# Patient Record
Sex: MALE | Race: WHITE | NOT HISPANIC OR LATINO | Employment: UNEMPLOYED | URBAN - METROPOLITAN AREA
[De-identification: names, ages, dates, MRNs, and addresses within clinical notes are randomized per-mention and may not be internally consistent; named-entity substitution may affect disease eponyms.]

---
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
## 2022-03-02 ENCOUNTER — OFFICE VISIT (OUTPATIENT)
Dept: URGENT CARE | Facility: URGENT CARE | Age: 22
End: 2022-03-02
Payer: COMMERCIAL

## 2022-03-02 VITALS
DIASTOLIC BLOOD PRESSURE: 80 MMHG | SYSTOLIC BLOOD PRESSURE: 127 MMHG | TEMPERATURE: 99.5 F | WEIGHT: 180 LBS | OXYGEN SATURATION: 96 % | BODY MASS INDEX: 23.1 KG/M2 | HEIGHT: 74 IN | HEART RATE: 115 BPM

## 2022-03-02 DIAGNOSIS — J02.0 STREPTOCOCCAL PHARYNGITIS: Primary | ICD-10-CM

## 2022-03-02 DIAGNOSIS — B00.1 COLD SORE: ICD-10-CM

## 2022-03-02 LAB — DEPRECATED S PYO AG THROAT QL EIA: POSITIVE

## 2022-03-02 PROCEDURE — 99203 OFFICE O/P NEW LOW 30 MIN: CPT | Performed by: PHYSICIAN ASSISTANT

## 2022-03-02 PROCEDURE — 87880 STREP A ASSAY W/OPTIC: CPT | Performed by: PHYSICIAN ASSISTANT

## 2022-03-02 RX ORDER — AMOXICILLIN 500 MG/1
500 CAPSULE ORAL 2 TIMES DAILY
Qty: 20 CAPSULE | Refills: 0 | Status: SHIPPED | OUTPATIENT
Start: 2022-03-02 | End: 2022-03-02 | Stop reason: ALTCHOICE

## 2022-03-02 RX ORDER — CEFDINIR 300 MG/1
300 CAPSULE ORAL 2 TIMES DAILY
Qty: 20 CAPSULE | Refills: 0 | Status: SHIPPED | OUTPATIENT
Start: 2022-03-02 | End: 2022-03-12

## 2022-03-02 RX ORDER — VALACYCLOVIR HYDROCHLORIDE 1 G/1
2000 TABLET, FILM COATED ORAL 2 TIMES DAILY
Qty: 4 TABLET | Refills: 0 | Status: SHIPPED | OUTPATIENT
Start: 2022-03-02 | End: 2022-04-07

## 2022-03-02 NOTE — PROGRESS NOTES
"URGENT CARE VISIT:    SUBJECTIVE:   Chicho Castellon is a 21 year old male presenting with a chief complaint of sore throat.  Onset was 2 day(s) ago. He denies the following symptoms: fever, chills, stuffy nose and cough - non-productive. Course of illness is same. Treatment measures tried include Tylenol/Ibuprofen with no relief of symptoms. Predisposing factors include None.    Also, requests cold sore medication refill    PMH: History reviewed. No pertinent past medical history.  Allergies: Patient has no allergy information on record.   Medications:   Current Outpatient Medications   Medication Sig Dispense Refill     cefdinir (OMNICEF) 300 MG capsule Take 1 capsule (300 mg) by mouth 2 times daily for 10 days 20 capsule 0     valACYclovir (VALTREX) 1000 mg tablet Take 2 tablets (2,000 mg) by mouth 2 times daily for 1 day 4 tablet 0     acyclovir (ZOVIRAX) 800 MG tablet Take 1 tablet (800 mg) by mouth 2 times daily for 7 days For cold sore 14 tablet 5     Social History:   Social History     Tobacco Use     Smoking status: Never Smoker     Smokeless tobacco: Never Used   Substance Use Topics     Alcohol use: No       ROS:  Review of systems negative except as stated above.    OBJECTIVE:  /80 (BP Location: Left arm, Patient Position: Sitting, Cuff Size: Adult Regular)   Pulse 115   Temp 99.5  F (37.5  C) (Tympanic)   Ht 1.88 m (6' 2\")   Wt 81.6 kg (180 lb)   SpO2 96%   BMI 23.11 kg/m    GENERAL APPEARANCE: healthy, alert and no distress  EYES: EOMI,  PERRL, conjunctiva clear  HENT: ear canals and TM's normal.  Moderately edematous oropharynx. Uvula midline  NECK: supple, nontender, no lymphadenopathy  RESP: lungs clear to auscultation - no rales, rhonchi or wheezes  CV: regular rates and rhythm, normal S1 S2, no murmur noted  SKIN: no suspicious lesions or rashes    Labs:    Results for orders placed or performed in visit on 03/02/22   Streptococcus A Rapid Screen w/Reflex to PCR     Status: Abnormal    " Specimen: Throat; Swab   Result Value Ref Range    Group A Strep antigen Positive (A) Negative       ASSESSMENT:    ICD-10-CM    1. Streptococcal pharyngitis  J02.0 Streptococcus A Rapid Screen w/Reflex to PCR     cefdinir (OMNICEF) 300 MG capsule     DISCONTINUED: amoxicillin (AMOXIL) 500 MG capsule   2. Cold sore  B00.1 valACYclovir (VALTREX) 1000 mg tablet       PLAN:  Patient Instructions   Strep:  Patient was educated on the natural course of bacterial throat infection. Possibly allergic to Amoxicillin. Will rx Cefdinir. Take medications as prescribed. Side effects discussed. Conservative measures discussed including warm fluids, salt water gargles, Lozenges (Cepacol), and over-the-counter analgesics (Tylenol or Ibuprofen). To prevent spread avoid sharing utensils or glasses until he has completed 24 hours of antibiotic treatment.  Change toothbrush after 24 hrs of being on antibiotic. See your primary care provider if symptoms worsen or do not improve in 5 days. Seek emergency care if you develop severe throat pain, or difficulty swallowing.     Cold sores:  Has recurrent cold sore but no active infection. Valtrex refilled.  Patient verbalized understanding and is agreeable to plan. The patient was discharged ambulatory and in stable condition.    Juany Riggs PA-C ....................  3/2/2022   3:33 PM

## 2022-04-05 NOTE — PROGRESS NOTES
SUBJECTIVE:   CC: Chicho Castellon is an 21 year old male who presents for preventative health visit.     Patient has been advised of split billing requirements and indicates understanding: Yes  Healthy Habits:     Getting at least 3 servings of Calcium per day:  Yes    Bi-annual eye exam:  NO    Dental care twice a year:  Yes    Sleep apnea or symptoms of sleep apnea:  None    Diet:  Regular (no restrictions) and Breakfast skipped    Frequency of exercise:  2-3 days/week    Duration of exercise:  Other    Taking medications regularly:  Yes    PHQ-2 Total Score: 0    Additional concerns today:  No     He reports being in good health.  He has a history of cold sores and takes valacyclovir when needed.  This can occur every few months.    Social history: Single, no children.  He recently graduated from [employer] [place] and will be starting a job doing [employer] starting in June.          Today's PHQ-2 Score:   PHQ-2 ( 1999 Pfizer) 12/31/2018   Q1: Little interest or pleasure in doing things 0   Q2: Feeling down, depressed or hopeless 0   PHQ-2 Score 0   PHQ-2 Total Score (12-17 Years)- Positive if 3 or more points; Administer PHQ-A if positive 0       Abuse: Current or Past(Physical, Sexual or Emotional)- No  Do you feel safe in your environment? Yes    Have you ever done Advance Care Planning? (For example, a Health Directive, POLST, or a discussion with a medical provider or your loved ones about your wishes): No, advance care planning information given to patient to review.  Patient declined advance care planning discussion at this time.    Social History     Tobacco Use     Smoking status: Never Smoker     Smokeless tobacco: Never Used   Substance Use Topics     Alcohol use: No     If you drink alcohol do you typically have >3 drinks per day or >7 drinks per week? No    No flowsheet data found.  AUDIT - Alcohol Use Disorders Identification Test - Reproduced from the World Health Organization Audit 2001  (Second Edition) 4/7/2022   1.  How often do you have a drink containing alcohol? 2 to 3 times a week   2.  How many drinks containing alcohol do you have on a typical day when you are drinking? 5 or 6   3.  How often do you have five or more drinks on one occasion? Weekly   4.  How often during the last year have you found that you were not able to stop drinking once you had started? Never   5.  How often during the last year have you failed to do what was normally expected of you because of drinking? Never   6.  How often during the last year have you needed a first drink in the morning to get yourself going after a heavy drinking session? Never   7.  How often during the last year have you had a feeling of guilt or remorse after drinking? Less than monthly   8.  How often during the last year have you been unable to remember what happened the night before because of your drinking? Less than monthly   9.  Have you or someone else been injured because of your drinking? No   10. Has a relative, friend, doctor or other health care worker been concerned about your drinking or suggested you cut down? No   TOTAL SCORE 10       Last PSA: No results found for: PSA    Reviewed orders with patient. Reviewed health maintenance and updated orders accordingly - Yes  Labs reviewed in EPIC    Reviewed and updated as needed this visit by clinical staff                  Reviewed and updated as needed this visit by Provider                     Review of Systems      OBJECTIVE:   There were no vitals taken for this visit.    Physical Exam  GENERAL: healthy, alert and no distress  EYES: Eyes grossly normal to inspection, PERRL and conjunctivae and sclerae normal  HENT: ear canals and TM's normal, nose and mouth without ulcers or lesions  NECK: no adenopathy, no asymmetry, masses, or scars and thyroid normal to palpation  RESP: lungs clear to auscultation - no rales, rhonchi or wheezes  CV: regular rate and rhythm, normal S1 S2, no S3  "or S4, no murmur, click or rub, no peripheral edema and peripheral pulses strong  ABDOMEN: soft, nontender, no hepatosplenomegaly, no masses and bowel sounds normal  MS: no gross musculoskeletal defects noted, no edema  SKIN: no suspicious lesions or rashes  NEURO: Normal strength and tone, mentation intact and speech normal  PSYCH: mentation appears normal, affect normal/bright    Diagnostic Test Results:  Labs reviewed in Epic    ASSESSMENT/PLAN:   1. Routine general medical examination at a health care facility  I encouraged him to get regular exercise and eat a healthy diet.  We are going to check labs as listed below.    2. HSV-1 (herpes simplex virus 1) infection  He was given a refill of valacyclovir that he uses as needed for cold sore outbreaks.  - valACYclovir (VALTREX) 1000 mg tablet; Take 2 tablets (2,000 mg) by mouth 2 times daily  Dispense: 20 tablet; Refill: 2    3. Screening for HIV (human immunodeficiency virus)  - HIV Antigen Antibody Combo; Future  - HIV Antigen Antibody Combo    4. Need for hepatitis C screening test  - Hepatitis C Screen Reflex to HCV RNA Quant and Genotype; Future  - Hepatitis C Screen Reflex to HCV RNA Quant and Genotype    5. Screening cholesterol level  - Lipid Profile (Chol, Trig, HDL, LDL calc); Future  - Lipid Profile (Chol, Trig, HDL, LDL calc)      Patient has been advised of split billing requirements and indicates understanding: Yes    COUNSELING:   Reviewed preventive health counseling, as reflected in patient instructions       Regular exercise       Healthy diet/nutrition    Estimated body mass index is 23.11 kg/m  as calculated from the following:    Height as of 3/2/22: 1.88 m (6' 2\").    Weight as of 3/2/22: 81.6 kg (180 lb).         He reports that he has never smoked. He has never used smokeless tobacco.      Counseling Resources:  ATP IV Guidelines  Pooled Cohorts Equation Calculator  FRAX Risk Assessment  ICSI Preventive Guidelines  Dietary Guidelines for " Americans, 2010  USDA's MyPlate  ASA Prophylaxis  Lung CA Screening    Antony Castrejon, [facility]

## 2022-04-07 ENCOUNTER — OFFICE VISIT (OUTPATIENT)
Dept: FAMILY MEDICINE | Facility: CLINIC | Age: 22
End: 2022-04-07
Payer: COMMERCIAL

## 2022-04-07 VITALS
HEART RATE: 97 BPM | HEIGHT: 74 IN | RESPIRATION RATE: 16 BRPM | SYSTOLIC BLOOD PRESSURE: 130 MMHG | DIASTOLIC BLOOD PRESSURE: 78 MMHG | TEMPERATURE: 98 F | BODY MASS INDEX: 24.13 KG/M2 | OXYGEN SATURATION: 97 % | WEIGHT: 188 LBS

## 2022-04-07 DIAGNOSIS — Z11.59 NEED FOR HEPATITIS C SCREENING TEST: ICD-10-CM

## 2022-04-07 DIAGNOSIS — Z13.220 SCREENING CHOLESTEROL LEVEL: ICD-10-CM

## 2022-04-07 DIAGNOSIS — Z11.4 SCREENING FOR HIV (HUMAN IMMUNODEFICIENCY VIRUS): ICD-10-CM

## 2022-04-07 DIAGNOSIS — B00.9 HSV-1 (HERPES SIMPLEX VIRUS 1) INFECTION: ICD-10-CM

## 2022-04-07 DIAGNOSIS — Z00.00 ROUTINE GENERAL MEDICAL EXAMINATION AT A HEALTH CARE FACILITY: Primary | ICD-10-CM

## 2022-04-07 PROCEDURE — 80061 LIPID PANEL: CPT | Performed by: FAMILY MEDICINE

## 2022-04-07 PROCEDURE — 87389 HIV-1 AG W/HIV-1&-2 AB AG IA: CPT | Performed by: FAMILY MEDICINE

## 2022-04-07 PROCEDURE — 90471 IMMUNIZATION ADMIN: CPT | Performed by: FAMILY MEDICINE

## 2022-04-07 PROCEDURE — 90715 TDAP VACCINE 7 YRS/> IM: CPT | Performed by: FAMILY MEDICINE

## 2022-04-07 PROCEDURE — 36415 COLL VENOUS BLD VENIPUNCTURE: CPT | Performed by: FAMILY MEDICINE

## 2022-04-07 PROCEDURE — 86803 HEPATITIS C AB TEST: CPT | Performed by: FAMILY MEDICINE

## 2022-04-07 PROCEDURE — 99395 PREV VISIT EST AGE 18-39: CPT | Mod: 25 | Performed by: FAMILY MEDICINE

## 2022-04-07 RX ORDER — VALACYCLOVIR HYDROCHLORIDE 1 G/1
2000 TABLET, FILM COATED ORAL 2 TIMES DAILY
Qty: 20 TABLET | Refills: 2 | Status: SHIPPED | OUTPATIENT
Start: 2022-04-07 | End: 2022-09-26

## 2022-04-07 ASSESSMENT — ENCOUNTER SYMPTOMS
JOINT SWELLING: 0
PARESTHESIAS: 0
CONSTIPATION: 0
MYALGIAS: 0
SHORTNESS OF BREATH: 0
NERVOUS/ANXIOUS: 0
NAUSEA: 0
PALPITATIONS: 0
DIARRHEA: 0
ARTHRALGIAS: 0
COUGH: 0
FEVER: 0
WEAKNESS: 0
HEARTBURN: 0
HEMATOCHEZIA: 0
HEADACHES: 0
SORE THROAT: 0
ABDOMINAL PAIN: 0
CHILLS: 0
FREQUENCY: 0
DYSURIA: 0
HEMATURIA: 0
EYE PAIN: 0
DIZZINESS: 0

## 2022-04-07 NOTE — NURSING NOTE
"Chief Complaint   Patient presents with     Physical     refill cold sore meds     initial BP (!) 148/83   Pulse 97   Temp 98  F (36.7  C) (Tympanic)   Resp 16   Ht 1.867 m (6' 1.5\")   Wt 85.3 kg (188 lb)   SpO2 97%   BMI 24.47 kg/m   Estimated body mass index is 24.47 kg/m  as calculated from the following:    Height as of this encounter: 1.867 m (6' 1.5\").    Weight as of this encounter: 85.3 kg (188 lb).  BP completed using cuff size: regular.  L  arm      Health Maintenance that is potentially due pending provider review:  NONE    n/a    Daniel Perkins ma  "

## 2022-04-08 LAB
CHOLEST SERPL-MCNC: 259 MG/DL
FASTING STATUS PATIENT QL REPORTED: NO
HCV AB SERPL QL IA: NONREACTIVE
HDLC SERPL-MCNC: 42 MG/DL
HIV 1+2 AB+HIV1 P24 AG SERPL QL IA: NONREACTIVE
LDLC SERPL CALC-MCNC: 177 MG/DL
NONHDLC SERPL-MCNC: 217 MG/DL
TRIGL SERPL-MCNC: 202 MG/DL

## 2022-06-14 NOTE — PATIENT INSTRUCTIONS
Strep:  Patient was educated on the natural course of bacterial throat infection. Take medications as prescribed. Side effects discussed. Conservative measures discussed including warm fluids, salt water gargles, Lozenges (Cepacol), and over-the-counter analgesics (Tylenol or Ibuprofen). To prevent spread avoid sharing utensils or glasses until he has completed 24 hours of antibiotic treatment.  Change toothbrush after 24 hrs of being on antibiotic. See your primary care provider if symptoms worsen or do not improve in 5 days. Seek emergency care if you develop severe throat pain, or difficulty swallowing.     Cold sores:  Has recurrent cold sore but no active infection. Valtrex refilled.   Intermediate Repair Preamble Text (Leave Blank If You Do Not Want): Wide undermining was performed with blunt dissection.

## 2022-09-18 ENCOUNTER — HEALTH MAINTENANCE LETTER (OUTPATIENT)
Age: 22
End: 2022-09-18

## 2022-09-23 ENCOUNTER — TELEPHONE (OUTPATIENT)
Dept: FAMILY MEDICINE | Facility: CLINIC | Age: 22
End: 2022-09-23

## 2022-09-23 DIAGNOSIS — B00.9 HSV-1 (HERPES SIMPLEX VIRUS 1) INFECTION: ICD-10-CM

## 2022-09-23 NOTE — TELEPHONE ENCOUNTER
Medication Question or Refill    Contacts       Type Contact Phone/Fax    09/23/2022 05:42 PM CDT Phone (Incoming) Chicho Castellon (Self) [phone] (H)          What medication are you calling about (include dose and sig)?: Zalaeyzlozir     Controlled Substance Agreement on file:   CSA -- Patient Level:    CSA: None found at the patient level.       Who prescribed the medication?: Dr. Cash Roque     Do you need a refill? Yes: yes    When did you use the medication last? 2 months ago    Patient offered an appointment? Yes: Patient was seen for this medicine already    Do you have any questions or concerns?  Yes: Would like RX transferred to a different pharmacy current one is out of network with his insurance. Would like it transferred to [facility]  [phone] at [address] [place] [address]     Preferred Pharmacy:   [id number] [place] [address] AT [facility] & [address] [place] [facility] [address]  Phone: [phone] Fax: [phone]    [facility]/pharmacy #[id number] [address]  [address] [facility] [address]  Phone: [phone] Fax: [phone]      Could we send this information to you [facility] or would you prefer to receive a phone call?:   Patient would prefer a phone call   Okay to leave a detailed message?: Yes at Cell number on file:    Telephone Information:   Mobile [phone]

## 2022-09-26 RX ORDER — VALACYCLOVIR HYDROCHLORIDE 1 G/1
2000 TABLET, FILM COATED ORAL 2 TIMES DAILY
Qty: 20 TABLET | Refills: 2 | Status: SHIPPED | OUTPATIENT
Start: 2022-09-26 | End: 2022-09-26

## 2022-09-26 RX ORDER — VALACYCLOVIR HYDROCHLORIDE 1 G/1
2000 TABLET, FILM COATED ORAL 2 TIMES DAILY
Qty: 20 TABLET | Refills: 2 | Status: SHIPPED | OUTPATIENT
Start: 2022-09-26 | End: 2024-02-02

## 2022-09-26 NOTE — TELEPHONE ENCOUNTER
Prescription approved per [facility] Refill Protocol.  [place]ton [place] RN

## 2023-05-07 ENCOUNTER — HEALTH MAINTENANCE LETTER (OUTPATIENT)
Age: 23
End: 2023-05-07

## 2024-02-02 ENCOUNTER — MYC REFILL (OUTPATIENT)
Dept: FAMILY MEDICINE | Facility: CLINIC | Age: 24
End: 2024-02-02
Payer: COMMERCIAL

## 2024-02-02 DIAGNOSIS — B00.9 HSV-1 (HERPES SIMPLEX VIRUS 1) INFECTION: ICD-10-CM

## 2024-02-05 RX ORDER — VALACYCLOVIR HYDROCHLORIDE 1 G/1
2000 TABLET, FILM COATED ORAL 2 TIMES DAILY
Qty: 20 TABLET | Refills: 2 | Status: SHIPPED | OUTPATIENT
Start: 2024-02-05 | End: 2024-02-27

## 2024-02-27 ENCOUNTER — OFFICE VISIT (OUTPATIENT)
Dept: FAMILY MEDICINE | Facility: CLINIC | Age: 24
End: 2024-02-27
Payer: COMMERCIAL

## 2024-02-27 VITALS
OXYGEN SATURATION: 99 % | BODY MASS INDEX: 25.71 KG/M2 | TEMPERATURE: 97.5 F | WEIGHT: 194 LBS | DIASTOLIC BLOOD PRESSURE: 89 MMHG | HEART RATE: 88 BPM | RESPIRATION RATE: 16 BRPM | HEIGHT: 73 IN | SYSTOLIC BLOOD PRESSURE: 139 MMHG

## 2024-02-27 DIAGNOSIS — R68.82 LOW LIBIDO: ICD-10-CM

## 2024-02-27 DIAGNOSIS — Z11.3 SCREENING FOR STDS (SEXUALLY TRANSMITTED DISEASES): ICD-10-CM

## 2024-02-27 DIAGNOSIS — Z13.1 SCREENING FOR DIABETES MELLITUS: ICD-10-CM

## 2024-02-27 DIAGNOSIS — Z13.220 SCREENING CHOLESTEROL LEVEL: ICD-10-CM

## 2024-02-27 DIAGNOSIS — B00.9 HSV-1 (HERPES SIMPLEX VIRUS 1) INFECTION: ICD-10-CM

## 2024-02-27 DIAGNOSIS — Z00.00 ROUTINE GENERAL MEDICAL EXAMINATION AT A HEALTH CARE FACILITY: Primary | ICD-10-CM

## 2024-02-27 LAB
ALBUMIN SERPL BCG-MCNC: 4.8 G/DL (ref 3.5–5.2)
ALP SERPL-CCNC: 93 U/L (ref 40–150)
ALT SERPL W P-5'-P-CCNC: 37 U/L (ref 0–70)
ANION GAP SERPL CALCULATED.3IONS-SCNC: 11 MMOL/L (ref 7–15)
AST SERPL W P-5'-P-CCNC: 24 U/L (ref 0–45)
BILIRUB SERPL-MCNC: 0.5 MG/DL
BUN SERPL-MCNC: 18 MG/DL (ref 6–20)
C TRACH DNA SPEC QL PROBE+SIG AMP: NEGATIVE
CALCIUM SERPL-MCNC: 9.4 MG/DL (ref 8.6–10)
CHLORIDE SERPL-SCNC: 104 MMOL/L (ref 98–107)
CHOLEST SERPL-MCNC: 229 MG/DL
CREAT SERPL-MCNC: 1.22 MG/DL (ref 0.67–1.17)
DEPRECATED HCO3 PLAS-SCNC: 25 MMOL/L (ref 22–29)
EGFRCR SERPLBLD CKD-EPI 2021: 85 ML/MIN/1.73M2
ERYTHROCYTE [DISTWIDTH] IN BLOOD BY AUTOMATED COUNT: 12.1 % (ref 10–15)
FASTING STATUS PATIENT QL REPORTED: ABNORMAL
GLUCOSE SERPL-MCNC: 96 MG/DL (ref 70–99)
HCT VFR BLD AUTO: 46.3 % (ref 40–53)
HDLC SERPL-MCNC: 49 MG/DL
HGB BLD-MCNC: 16 G/DL (ref 13.3–17.7)
HIV 1+2 AB+HIV1 P24 AG SERPL QL IA: NONREACTIVE
LDLC SERPL CALC-MCNC: 159 MG/DL
MCH RBC QN AUTO: 30.7 PG (ref 26.5–33)
MCHC RBC AUTO-ENTMCNC: 34.6 G/DL (ref 31.5–36.5)
MCV RBC AUTO: 89 FL (ref 78–100)
N GONORRHOEA DNA SPEC QL NAA+PROBE: NEGATIVE
NONHDLC SERPL-MCNC: 180 MG/DL
PLATELET # BLD AUTO: 230 10E3/UL (ref 150–450)
POTASSIUM SERPL-SCNC: 4.3 MMOL/L (ref 3.4–5.3)
PROT SERPL-MCNC: 6.7 G/DL (ref 6.4–8.3)
RBC # BLD AUTO: 5.21 10E6/UL (ref 4.4–5.9)
SHBG SERPL-SCNC: 21 NMOL/L (ref 11–80)
SODIUM SERPL-SCNC: 140 MMOL/L (ref 135–145)
T PALLIDUM AB SER QL: NONREACTIVE
TRIGL SERPL-MCNC: 106 MG/DL
WBC # BLD AUTO: 5.6 10E3/UL (ref 4–11)

## 2024-02-27 PROCEDURE — 87591 N.GONORRHOEAE DNA AMP PROB: CPT | Performed by: FAMILY MEDICINE

## 2024-02-27 PROCEDURE — 87389 HIV-1 AG W/HIV-1&-2 AB AG IA: CPT | Performed by: FAMILY MEDICINE

## 2024-02-27 PROCEDURE — 86780 TREPONEMA PALLIDUM: CPT | Performed by: FAMILY MEDICINE

## 2024-02-27 PROCEDURE — 99395 PREV VISIT EST AGE 18-39: CPT | Performed by: FAMILY MEDICINE

## 2024-02-27 PROCEDURE — 36415 COLL VENOUS BLD VENIPUNCTURE: CPT | Performed by: FAMILY MEDICINE

## 2024-02-27 PROCEDURE — 84270 ASSAY OF SEX HORMONE GLOBUL: CPT | Performed by: FAMILY MEDICINE

## 2024-02-27 PROCEDURE — 80061 LIPID PANEL: CPT | Performed by: FAMILY MEDICINE

## 2024-02-27 PROCEDURE — 84403 ASSAY OF TOTAL TESTOSTERONE: CPT | Performed by: FAMILY MEDICINE

## 2024-02-27 PROCEDURE — 80053 COMPREHEN METABOLIC PANEL: CPT | Performed by: FAMILY MEDICINE

## 2024-02-27 PROCEDURE — 87491 CHLMYD TRACH DNA AMP PROBE: CPT | Performed by: FAMILY MEDICINE

## 2024-02-27 PROCEDURE — 85027 COMPLETE CBC AUTOMATED: CPT | Performed by: FAMILY MEDICINE

## 2024-02-27 RX ORDER — VALACYCLOVIR HYDROCHLORIDE 1 G/1
2000 TABLET, FILM COATED ORAL 2 TIMES DAILY
Qty: 20 TABLET | Refills: 2 | Status: SHIPPED | OUTPATIENT
Start: 2024-02-27

## 2024-02-27 SDOH — HEALTH STABILITY: PHYSICAL HEALTH: ON AVERAGE, HOW MANY DAYS PER WEEK DO YOU ENGAGE IN MODERATE TO STRENUOUS EXERCISE (LIKE A BRISK WALK)?: 6 DAYS

## 2024-02-27 SDOH — HEALTH STABILITY: PHYSICAL HEALTH: ON AVERAGE, HOW MANY MINUTES DO YOU ENGAGE IN EXERCISE AT THIS LEVEL?: 60 MIN

## 2024-02-27 ASSESSMENT — SOCIAL DETERMINANTS OF HEALTH (SDOH): HOW OFTEN DO YOU GET TOGETHER WITH FRIENDS OR RELATIVES?: MORE THAN THREE TIMES A WEEK

## 2024-02-27 ASSESSMENT — PAIN SCALES - GENERAL: PAINLEVEL: NO PAIN (0)

## 2024-02-27 NOTE — PROGRESS NOTES
"Preventive Care Visit  [facility]  Antony Castrejon DO, Family Medicine  Feb 27, 2024    Assessment & Plan     Routine general medical examination at a health care facility  He appears to be in good health.  I encouraged him to keep exercising and eating a healthy diet.  We are going to check fasting labs as listed below.    HSV-1 (herpes simplex virus 1) infection  He was given a refill of valacyclovir that he may use as needed for cold sore outbreaks.  - valACYclovir (VALTREX) 1000 mg tablet; Take 2 tablets (2,000 mg) by mouth 2 times daily    Screening cholesterol level  His cholesterol was quite high a couple years ago.  He is working on lifestyle modifications and we are going to recheck the levels today.  - Lipid Profile (Chol, Trig, HDL, LDL calc); Future  - Lipid Profile (Chol, Trig, HDL, LDL calc)    Screening for diabetes mellitus  - Comprehensive metabolic panel (BMP + Alb, Alk Phos, ALT, AST, Total. Bili, TP); Future  - Comprehensive metabolic panel (BMP + Alb, Alk Phos, ALT, AST, Total. Bili, TP)    Low libido  He is interested in having testosterone levels checked as he has struggled some with low libido and difficulty gaining muscle mass while working out in the gym.  - Testosterone Free and Total; Future  - CBC with platelets; Future  - Testosterone Free and Total  - CBC with platelets    Screening for STDs (sexually transmitted diseases)  - HIV Antigen Antibody Combo; Future  - Treponema Abs w Reflex to RPR and Titer; Future  - Chlamydia & Gonorrhea by PCR, [facility] - Clinic Collect; Future  - HIV Antigen Antibody Combo  - Treponema Abs w Reflex to RPR and Titer  - Chlamydia & Gonorrhea by PCR, [facility] - Clinic Collect    Patient has been advised of split billing requirements and indicates understanding: Yes          BMI  Estimated body mass index is 25.3 kg/m  as calculated from the following:    Height as of this encounter: 1.865 m (6' 1.43\").    Weight as of this " encounter: 88 kg (194 lb).   Weight management plan: Discussed healthy diet and exercise guidelines    Counseling  Appropriate preventive services were discussed with this patient, including applicable screening as appropriate for fall prevention, nutrition, physical activity, Tobacco-use cessation, weight loss and cognition.  Checklist reviewing preventive services available has been given to the patient.  Reviewed patient's diet, addressing concerns and/or questions.   He is at risk for psychosocial distress and has been provided with information to reduce risk.   The patient reports drinking more than one alcoholic drink per day and sometimes engages in binge or excessive drinking. The patient was counseled and given information about possible harmful effects of excessive alcohol intake as well as where to get help for alcohol problems.         Luis Valentino is a 23 year old, presenting for the following:  Physical        2/27/2024     6:50 AM   Additional Questions   Roomed by Milagro CURRY   Accompanied by n/a          HPI    He reports being in good health.  He has a history of cold sores and takes valacyclovir when needed.  This can occur every few months.  He is interested in having testosterone levels checked because he is having difficulty gaining muscle mass while working out in the gym and he has noticed some mild low libido symptoms.     Social history: Single, no children.  He graduated from [email or web address] [place] and works in investment banking             2/27/2024   General Health   How would you rate your overall physical health? Excellent   Feel stress (tense, anxious, or unable to sleep) Only a little   (!) STRESS CONCERN      2/27/2024   Nutrition   Three or more servings of calcium each day? Yes   Diet: Regular (no restrictions)   How many servings of fruit and vegetables per day? (!) 2-3   How many sweetened beverages each day? 0-1         2/27/2024   Exercise   Days per week of  moderate/strenous exercise 6 days   Average minutes spent exercising at this level 60 min         2/27/2024   Social Factors   Frequency of gathering with friends or relatives More than three times a week   Worry food won't last until get money to buy more No   Food not last or not have enough money for food? No   Do you have housing?  Yes   Are you worried about losing your housing? No   Lack of transportation? No   Unable to get utilities (heat,electricity)? Yes   Want help with housing or utility concern? No   (!) FINANCIAL RESOURCE STRAIN CONCERN      2/27/2024   Dental   Dentist two times every year? Yes         2/27/2024   TB Screening   Were you born outside of [place]?  No         Today's PHQ-2 Score:       2/27/2024     7:00 AM   PHQ-2 ( 1999 [employer])   Q1: Little interest or pleasure in doing things 0   Q2: Feeling down, depressed or hopeless 0   PHQ-2 Score 0   Q1: Little interest or pleasure in doing things Not at all   Q2: Feeling down, depressed or hopeless Not at all   PHQ-2 Score 0           2/27/2024   Substance Use   Alcohol more than 3/day or more than 7/wk Yes   How often do you have a drink containing alcohol 2 to 3 times a week   How many alcohol drinks on typical day 5 or 6   How often do you have 5+ drinks at one occasion Weekly   Audit 2/3 Score 5   How often not able to stop drinking once started Less than monthly   How often failed to do what normally expected Less than monthly   How often needed first drink in am after a heavy drinking session Never   How often feeling of guilt or remorse after drinking Less than monthly   How often unable to remember what happened the night before Less than monthly   Have you or someone else been injured because of your drinking No   Has anyone been concerned or suggested you cut down on drinking No   TOTAL SCORE - AUDIT 12   Do you use any other substances recreationally? (!) ALCOHOL    (!) COCAINE     Social History     Tobacco Use    Smoking status: Some  "Days    Smokeless tobacco: Never   Substance Use Topics    Alcohol use: Yes    Drug use: Yes     Types: Marijuana           2/27/2024   STI Screening   New sexual partner(s) since last STI/HIV test? (!) YES         2/27/2024   Contraception/Family Planning   Questions about contraception or family planning No        Reviewed and updated as needed this visit by Provider                          Review of Systems  Constitutional, HEENT, cardiovascular, pulmonary, GI, , musculoskeletal, neuro, skin, endocrine and psych systems are negative, except as otherwise noted.     Objective    Exam  /89 (BP Location: Left arm, Patient Position: Sitting, Cuff Size: Adult Large)   Pulse 88   Temp 97.5  F (36.4  C) (Temporal)   Resp 16   Ht 1.865 m (6' 1.43\")   Wt 88 kg (194 lb)   SpO2 99%   BMI 25.30 kg/m     Estimated body mass index is 25.3 kg/m  as calculated from the following:    Height as of this encounter: 1.865 m (6' 1.43\").    Weight as of this encounter: 88 kg (194 lb).    Physical Exam  GENERAL: alert and no distress  EYES: Eyes grossly normal to inspection, PERRL and conjunctivae and sclerae normal  HENT: ear canals and TM's normal, nose and mouth without ulcers or lesions  NECK: no adenopathy, no asymmetry, masses, or scars  RESP: lungs clear to auscultation - no rales, rhonchi or wheezes  CV: regular rate and rhythm, normal S1 S2, no S3 or S4, no murmur, click or rub, no peripheral edema  ABDOMEN: soft, nontender, no hepatosplenomegaly, no masses and bowel sounds normal  MS: no gross musculoskeletal defects noted, no edema  SKIN: no suspicious lesions or rashes  NEURO: Normal strength and tone, mentation intact and speech normal  PSYCH: mentation appears normal, affect normal/bright      Signed Electronically by: Antony Castrejon, DO    "

## 2024-02-27 NOTE — COMMUNITY RESOURCES LIST (ENGLISH)
02/27/2024    [place]  N/A  For questions about this resource list or additional care needs, please contact your primary care clinic or care manager.  Phone: [phone]   Email: N/A   Address: [facility] [place] [address]   Hours: N/A        Financial Stability       Utility payment assistance  1  [employer] - ???????Gas Affordability Program Distance: 0.12 miles      Phone/Virtual   [address] [address] [place] [address]  Language: English  Hours: Mon - Sun Open 24 Hours  Fees: Sliding Fee   Phone: (861) 132-9312 Website: http://www.[employer]/en-us/residential/customer-service/billing-payment/need-help-paying-your-bill?[place]     2  [employer] [place] Distance: 0.25 miles      Phone/Virtual   [address] [place] [address]  Language: English  Hours: Mon - Thu 9:00 AM - 5:00 PM  Fees: Free   Phone: (654) 222-8928 Email: general.questions@[employer] Website: https://www.[employer]          Important Numbers & Websites       Emergency Services   911  City Services   311  Poison Control   (859) 514-5030  Suicide Prevention Lifeline   (972) 834-9683 (TALK)  Child Abuse Hotline   (533) 765-9768 (4-A-Child)  Sexual Assault Hotline   (987) 704-1862 (HOPE)  National Runaway Safeline   (245) 905-3054 (RUNAWAY)  All-Options Talkline   (666) 448-1251  Substance Abuse Referral   (917) 675-3158 (HELP)

## 2024-02-29 LAB
TESTOST FREE SERPL-MCNC: 8.39 NG/DL
TESTOST SERPL-MCNC: 333 NG/DL (ref 240–950)

## 2024-10-30 ENCOUNTER — MYC REFILL (OUTPATIENT)
Dept: FAMILY MEDICINE | Facility: CLINIC | Age: 24
End: 2024-10-30
Payer: COMMERCIAL

## 2024-10-30 DIAGNOSIS — B00.9 HSV-1 (HERPES SIMPLEX VIRUS 1) INFECTION: ICD-10-CM

## 2024-10-30 RX ORDER — VALACYCLOVIR HYDROCHLORIDE 1 G/1
2000 TABLET, FILM COATED ORAL 2 TIMES DAILY
Qty: 20 TABLET | Refills: 2 | Status: SHIPPED | OUTPATIENT
Start: 2024-10-30

## 2025-04-05 ENCOUNTER — HEALTH MAINTENANCE LETTER (OUTPATIENT)
Age: 25
End: 2025-04-05

## 2025-04-21 ENCOUNTER — TELEPHONE (OUTPATIENT)
Dept: FAMILY MEDICINE | Facility: CLINIC | Age: 25
End: 2025-04-21
Payer: COMMERCIAL

## 2025-04-21 NOTE — TELEPHONE ENCOUNTER
"DE,     Came in a year ago for physical and was under the impression that this would be \"free\"  Patient states he received a bill for 800-900 dollars in the mail and believes this is a mischarge  Patient states they have been working with billing and insurance who keeps going back and fourth on if it is a mischarge and if they will reverse it   Patient requesting PCP to review and advise on if anything can be changed to assist with getting this charge removed and or if PCP can explain reasoning for charge on preventative visit  Please advise    Thanks,  Liseth STEWARD RN        "

## 2025-04-22 NOTE — TELEPHONE ENCOUNTER
Left non detailed voicemail for patient asking that they callback.  When patient calls back please review below with patient and advise we will keep patient in the loop    Liseth STEWARD RN

## 2025-04-22 NOTE — TELEPHONE ENCOUNTER
Please reach out to this patient and let him know that we are looking into this situation to try to better understand what happened.  We will be in touch once we get more information back from our billing department.  I anticipate that he should be hearing back from us soon.  Thank you, DE

## 2025-04-22 NOTE — TELEPHONE ENCOUNTER
Patient called back   Informed of PCP's message   Patient very grateful for the call back and that we are working on figuring this out    Liseth STEWARD RN

## 2025-05-14 ENCOUNTER — TELEPHONE (OUTPATIENT)
Dept: FAMILY MEDICINE | Facility: CLINIC | Age: 25
End: 2025-05-14